# Patient Record
Sex: FEMALE | Race: BLACK OR AFRICAN AMERICAN | URBAN - METROPOLITAN AREA
[De-identification: names, ages, dates, MRNs, and addresses within clinical notes are randomized per-mention and may not be internally consistent; named-entity substitution may affect disease eponyms.]

---

## 2019-07-31 PROBLEM — 414916001 OBESITY: Status: ACTIVE | Noted: 2019-07-31

## 2022-04-30 ENCOUNTER — TELEPHONE ENCOUNTER (OUTPATIENT)
Dept: URBAN - METROPOLITAN AREA CLINIC 121 | Facility: CLINIC | Age: 77
End: 2022-04-30

## 2022-04-30 RX ORDER — LISINOPRIL AND HYDROCHLOROTHIAZIDE TABLETS 20; 25 MG/1; MG/1
TABLET ORAL
OUTPATIENT
Start: 2011-05-20

## 2022-04-30 RX ORDER — OMEPRAZOLE 40 MG/1
CAPSULE, DELAYED RELEASE ORAL
OUTPATIENT
Start: 2011-05-20

## 2022-04-30 RX ORDER — VALSARTAN 80 MG
TABLET ORAL
OUTPATIENT
Start: 2008-09-22

## 2022-04-30 RX ORDER — POLYETHYLENE GLYCOL 3350, SODIUM SULFATE, SODIUM CHLORIDE, POTASSIUM CHLORIDE, ASCORBIC ACID, SODIUM ASCORBATE 7.5-2.691G
MIX AND USE AS DIRECTED KIT ORAL
OUTPATIENT
Start: 2013-12-19

## 2022-04-30 RX ORDER — OMEPRAZOLE 40 MG/1
CAPSULE, DELAYED RELEASE ORAL
OUTPATIENT
Start: 2011-05-20 | End: 2014-01-13

## 2022-04-30 RX ORDER — VALSARTAN 80 MG
QD TABLET ORAL
OUTPATIENT
Start: 2011-05-12

## 2022-05-01 ENCOUNTER — TELEPHONE ENCOUNTER (OUTPATIENT)
Dept: URBAN - METROPOLITAN AREA CLINIC 121 | Facility: CLINIC | Age: 77
End: 2022-05-01

## 2022-05-01 RX ORDER — LISINOPRIL AND HYDROCHLOROTHIAZIDE TABLETS 20; 25 MG/1; MG/1
QD TABLET ORAL
Status: ACTIVE | COMMUNITY
Start: 2014-01-13

## 2022-05-01 RX ORDER — OMEPRAZOLE 40 MG/1
TAKE 1 TABLET PO 1 HR BEFORE BREAKFAST CAPSULE, DELAYED RELEASE ORAL
Status: ACTIVE | COMMUNITY
Start: 2012-05-15

## 2022-05-01 RX ORDER — VITAM B12 100 MCG
TAB ORAL
Status: ACTIVE | COMMUNITY
Start: 2016-08-05

## 2022-05-01 RX ORDER — METFORMIN HCL 500 MG/1
BID TABLET ORAL
Status: ACTIVE | COMMUNITY
Start: 2014-01-13

## 2022-05-01 RX ORDER — POLYETHYLENE GLYCOL 3350, SODIUM SULFATE, SODIUM CHLORIDE, POTASSIUM CHLORIDE, ASCORBIC ACID, SODIUM ASCORBATE 7.5-2.691G
MIX AND USE AS DIRECTED. MAY SUB FOR TRILYTE KIT ORAL
Status: ACTIVE | COMMUNITY
Start: 2016-08-05

## 2022-05-01 RX ORDER — SODIUM SULFATE, POTASSIUM SULFATE, MAGNESIUM SULFATE 17.5; 3.13; 1.6 G/ML; G/ML; G/ML
MIX AND USE AS DIRECTED. MAY SUB FOR GOLYTELY SOLUTION, CONCENTRATE ORAL
Status: ACTIVE | COMMUNITY
Start: 2019-07-31

## 2022-07-18 ENCOUNTER — TELEPHONE ENCOUNTER (OUTPATIENT)
Dept: URBAN - METROPOLITAN AREA CLINIC 27 | Facility: CLINIC | Age: 77
End: 2022-07-18

## 2022-09-23 ENCOUNTER — OFFICE VISIT (OUTPATIENT)
Dept: URBAN - METROPOLITAN AREA CLINIC 27 | Facility: CLINIC | Age: 77
End: 2022-09-23
Payer: COMMERCIAL

## 2022-09-23 ENCOUNTER — WEB ENCOUNTER (OUTPATIENT)
Dept: URBAN - METROPOLITAN AREA CLINIC 27 | Facility: CLINIC | Age: 77
End: 2022-09-23

## 2022-09-23 ENCOUNTER — LAB OUTSIDE AN ENCOUNTER (OUTPATIENT)
Dept: URBAN - METROPOLITAN AREA CLINIC 27 | Facility: CLINIC | Age: 77
End: 2022-09-23

## 2022-09-23 ENCOUNTER — DASHBOARD ENCOUNTERS (OUTPATIENT)
Age: 77
End: 2022-09-23

## 2022-09-23 VITALS
WEIGHT: 183 LBS | HEART RATE: 73 BPM | HEIGHT: 64 IN | TEMPERATURE: 96.6 F | DIASTOLIC BLOOD PRESSURE: 100 MMHG | BODY MASS INDEX: 31.24 KG/M2 | SYSTOLIC BLOOD PRESSURE: 162 MMHG

## 2022-09-23 DIAGNOSIS — Z80.0 FAMILY HISTORY OF MALIGNANT NEOPLASM OF DIGESTIVE ORGANS: ICD-10-CM

## 2022-09-23 DIAGNOSIS — E11.9 TYPE 2 DIABETES MELLITUS WITHOUT COMPLICATION, WITHOUT LONG-TERM CURRENT USE OF INSULIN: ICD-10-CM

## 2022-09-23 DIAGNOSIS — I10 ESSENTIAL (PRIMARY) HYPERTENSION: ICD-10-CM

## 2022-09-23 PROBLEM — 313436004: Status: ACTIVE | Noted: 2022-09-23

## 2022-09-23 PROCEDURE — 99204 OFFICE O/P NEW MOD 45 MIN: CPT | Performed by: INTERNAL MEDICINE

## 2022-09-23 RX ORDER — VITAM B12 100 MCG
TAB ORAL
Status: ACTIVE | COMMUNITY
Start: 2016-08-05

## 2022-09-23 RX ORDER — SODIUM SULFATE, POTASSIUM SULFATE, MAGNESIUM SULFATE 17.5; 3.13; 1.6 G/ML; G/ML; G/ML
MIX AND USE AS DIRECTED. MAY SUB FOR GOLYTELY SOLUTION, CONCENTRATE ORAL
Status: ACTIVE | COMMUNITY
Start: 2019-07-31

## 2022-09-23 RX ORDER — METFORMIN HCL 500 MG/1
BID TABLET ORAL
Status: ACTIVE | COMMUNITY
Start: 2014-01-13

## 2022-09-23 RX ORDER — POLYETHYLENE GLYCOL 3350, SODIUM SULFATE, SODIUM CHLORIDE, POTASSIUM CHLORIDE, ASCORBIC ACID, SODIUM ASCORBATE 7.5-2.691G
MIX AND USE AS DIRECTED. MAY SUB FOR TRILYTE KIT ORAL
Status: ACTIVE | COMMUNITY
Start: 2016-08-05

## 2022-09-23 RX ORDER — LISINOPRIL AND HYDROCHLOROTHIAZIDE TABLETS 20; 25 MG/1; MG/1
QD TABLET ORAL
Status: ACTIVE | COMMUNITY
Start: 2014-01-13

## 2022-09-23 RX ORDER — POLYETHYLENE GLYCOL 3350, SODIUM SULFATE, SODIUM CHLORIDE, POTASSIUM CHLORIDE, ASCORBIC ACID, SODIUM ASCORBATE 140-9-5.2G
AS DIRECTED KIT ORAL ONCE
Qty: 1 BOX | Refills: 0 | OUTPATIENT
Start: 2022-09-23 | End: 2022-09-24

## 2022-09-23 RX ORDER — OMEPRAZOLE 40 MG/1
TAKE 1 TABLET PO 1 HR BEFORE BREAKFAST CAPSULE, DELAYED RELEASE ORAL
Status: ACTIVE | COMMUNITY
Start: 2012-05-15

## 2022-09-23 NOTE — HPI-TODAY'S VISIT:
Mrs. Vasquez presents today for colon cancer screening.  She has a history of colon polyps and her father had colon cancer.  Her mother also had kidney cancer.  She reports a family history of members having colon cancer.  Otherwise, she has no other GI complaints.  Her medical history is significant for hypertension and diabetes mellitus.

## 2022-10-28 ENCOUNTER — OFFICE VISIT (OUTPATIENT)
Dept: URBAN - METROPOLITAN AREA SURGERY CENTER 7 | Facility: SURGERY CENTER | Age: 77
End: 2022-10-28
Payer: COMMERCIAL

## 2022-10-28 DIAGNOSIS — Z80.0 BROTHER AT YOUNG AGE FAMILY HISTORY OF COLON CANCER: ICD-10-CM

## 2022-10-28 PROCEDURE — G8907 PT DOC NO EVENTS ON DISCHARG: HCPCS | Performed by: INTERNAL MEDICINE

## 2022-10-28 PROCEDURE — 45378 DIAGNOSTIC COLONOSCOPY: CPT | Performed by: INTERNAL MEDICINE

## 2022-10-28 RX ORDER — POLYETHYLENE GLYCOL 3350, SODIUM SULFATE, SODIUM CHLORIDE, POTASSIUM CHLORIDE, ASCORBIC ACID, SODIUM ASCORBATE 7.5-2.691G
MIX AND USE AS DIRECTED. MAY SUB FOR TRILYTE KIT ORAL
Status: ACTIVE | COMMUNITY
Start: 2016-08-05

## 2022-10-28 RX ORDER — VITAM B12 100 MCG
TAB ORAL
Status: ACTIVE | COMMUNITY
Start: 2016-08-05

## 2022-10-28 RX ORDER — SODIUM SULFATE, POTASSIUM SULFATE, MAGNESIUM SULFATE 17.5; 3.13; 1.6 G/ML; G/ML; G/ML
MIX AND USE AS DIRECTED. MAY SUB FOR GOLYTELY SOLUTION, CONCENTRATE ORAL
Status: ACTIVE | COMMUNITY
Start: 2019-07-31

## 2022-10-28 RX ORDER — LISINOPRIL AND HYDROCHLOROTHIAZIDE TABLETS 20; 25 MG/1; MG/1
QD TABLET ORAL
Status: ACTIVE | COMMUNITY
Start: 2014-01-13

## 2022-10-28 RX ORDER — OMEPRAZOLE 40 MG/1
TAKE 1 TABLET PO 1 HR BEFORE BREAKFAST CAPSULE, DELAYED RELEASE ORAL
Status: ACTIVE | COMMUNITY
Start: 2012-05-15

## 2022-10-28 RX ORDER — METFORMIN HCL 500 MG/1
BID TABLET ORAL
Status: ACTIVE | COMMUNITY
Start: 2014-01-13